# Patient Record
Sex: FEMALE | Race: WHITE
[De-identification: names, ages, dates, MRNs, and addresses within clinical notes are randomized per-mention and may not be internally consistent; named-entity substitution may affect disease eponyms.]

---

## 2018-01-28 ENCOUNTER — HOSPITAL ENCOUNTER (EMERGENCY)
Dept: HOSPITAL 45 - C.EDB | Age: 58
Discharge: HOME | End: 2018-01-28
Payer: COMMERCIAL

## 2018-01-28 VITALS — OXYGEN SATURATION: 96 % | HEART RATE: 101 BPM

## 2018-01-28 VITALS
WEIGHT: 293 LBS | WEIGHT: 293 LBS | HEIGHT: 62.01 IN | HEIGHT: 62.01 IN | BODY MASS INDEX: 53.24 KG/M2 | BODY MASS INDEX: 53.24 KG/M2

## 2018-01-28 VITALS — SYSTOLIC BLOOD PRESSURE: 147 MMHG | DIASTOLIC BLOOD PRESSURE: 89 MMHG | TEMPERATURE: 98.6 F
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
## 2018-01-28 DIAGNOSIS — S99.921A: Primary | ICD-10-CM

## 2018-01-28 DIAGNOSIS — Z79.899: ICD-10-CM
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
## 2018-01-28 DIAGNOSIS — Z79.01: ICD-10-CM
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
## 2018-01-29 NOTE — EMERGENCY ROOM VISIT NOTE
ED Visit Note


First contact with patient:  15:44


CHIEF COMPLAINT: Foot pain  





HISTORY OF PRESENT ILLNESS: This 57 year old white female patient presents to 

the emergency department complaining of swelling and pain in the right foot at 

rest and worse with weight bearing. The patient works at [employer], 

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  Evidently the sorority broke 

one of the tables, and as the patient went to move it, the table collapsed, and 

landed onto her right foot.  The patient is on [place] and now has significant 

bruising and pain. The patient rates the pain as dull and 7/10. The patient has 

taken nothing with relief of the pain. The patient is able to walk. No numbness 

or weakness. No ankle pain. There are no lacerations of the foot. The patient 

is able to move all of their toes and their ankle without pain. No previous 

fracture to this foot.





REVIEW OF SYSTEMS: GENERAL: A 6 system review of systems was completed with 

positives and pertinent negatives in the HPI.





ALLERGIES: Augmentin





MEDICATIONS: See EMR





PMH: See EMR





SOCIAL HISTORY: Employed and lives locally





PHYSICAL EXAM: Vital Signs: Reviewed Nurse's notes, vital signs stable. GENERAL

: White female, in no acute distress, but appears in pain, well-developed, well-

nourished. MUSCULOSKELATAL: There is no visual deformity of the right foot. 

There is notable erythema and ecchymosis over the superior mid foot. There is 

no significant warmth. There is tenderness and swelling over the superior mid 

foot. There is no tenderness over the lateral or medial malleolus.  No 

tenderness of the tib/fib.  The range of motion of the right foot is minimally 

limited secondary to pain. There is no tenderness over the plantar fascia.  The 

skin is intact and there are no lacerations or puncture wounds. Dorsalis pedis 

pulse 2+.  Capillary refill less than 2 seconds. 











RIGHT FOOT 3 VIEWS





HISTORY:      Right foot injury





COMPARISON: None.





FINDINGS: There is no fracture or dislocation. Dorsal soft tissue swelling.


Plantar and posterior calcaneal spurs. The Lisfranc joint is intact. No


radiopaque foreign bodies.





IMPRESSION:  


No fractures. Dorsal soft tissue swelling.











EMERGENCY DEPARTMENT COURSE: I examined the patient. An X-ray of the right foot 

was reviewed by myself and radiology and reveals no fracture or dislocation.  

Patient was placed in a postop shoe for comfort.  I offered her pain medication

, but she states that she prefers to use Tylenol, and can take this at home.  

She was given further discharge instructions as below and otherwise invited 

back to the ER with any new, worsening, or concerning symptoms.


Current/Historical Medications


Scheduled


Apixaban (Eliquis), 5 MG PO BID


Chlorpheniramine-Dm (Coricidin Hbp Cough & Col), 2 TABS PO DAILY


Cholecalciferol (Vitamin D 1000 Unit), 1,000 INTER.UNIT PO BID


Diltiazem Hcl Ext Rel (Tiazac), 180 MG PO QPM


Furosemide (Lasix), 40 MG PO BID17


Lisinopril (Zestril), 10 MG PO QAM


Metoprolol Succ (Toprol Xl) (Toprol-Xl ), 100 MG PO DAILY


Multivitamin (Multivitamin), 1 TAB PO DAILY


Tiotropium Bromide (Spiriva Handihaler), 1 CAP INH DAILY





Scheduled PRN


Acetaminophen (Tylenol), 1,000 MG PO UD PRN for Pain


Albuterol Hfa (Ventolin Hfa), 2 PUFFS INH Q4H PRN for SOB/Wheezing





Allergies


Coded Allergies:  


     Amoxicillin (Verified  Adverse Reaction, Unknown, GI SYMPTOMS- diarrhea, 1/ 28/18)


     Clavulanic Acid (Verified  Adverse Reaction, Unknown, GI SYMPTOMS- diarrhea

, 1/28/18)





Vital Signs











  Date Time  Temp Pulse Resp B/P (MAP) Pulse Ox O2 Delivery O2 Flow Rate FiO2


 


1/28/18 17:12  101 18  96   


 


1/28/18 15:39 37.0 112 18 147/89 92 Room Air  











Departure Information


Impression





 Primary Impression:  


 Injury of right foot





Dispostion


Home / Self-Care





Condition


FAIR





Forms


HOME CARE DOCUMENTATION FORM,                                                 

               Work Instructions,          


   Additional Instructions:  Patient was seen and evaluated today in the 

emergency department fo


      medical care.  Return to work on 2/1/2018.  Please excuse.


IMPORTANT VISIT INFORMATION





Patient Instructions


My [facility], ED [facility]





Additional Instructions





You were seen and evaluated today on an emergency basis only.  This is not a 

substitute for, or an effort to provide, complete comprehensive medical care.  

It is not possible to recognize and treat all injuries or illnesses in a single 

emergency department visit. For this reason it is recommended that you followup 

with your Workmen's Compensation provider for ongoing care and evaluation.





Take Tylenol 1000 mg every 6-8 hours for pain control.





Wear your postop shoe for comfort





You are welcome to return to the emergency department anytime with new, 

worsening, or concerning symptoms.





Work Instructions


Additional Work Instructions:  


Patient was seen and evaluated today in the emergency department for 


medical care.  Return to work on 2/1/2018.  Please excuse.

## 2018-02-02 ENCOUNTER — HOSPITAL ENCOUNTER (OUTPATIENT)
Dept: HOSPITAL 45 - C.RAD1850 | Age: 58
Discharge: HOME | End: 2018-02-02
Attending: NURSE PRACTITIONER
Payer: COMMERCIAL

## 2018-02-02 DIAGNOSIS — M79.671: Primary | ICD-10-CM

## 2018-02-02 NOTE — DIAGNOSTIC IMAGING REPORT
R FOOT MIN 3 VIEWS ROUTINE



CLINICAL HISTORY: Right foot pain status post trauma     



COMPARISON: 1/28/2018



DISCUSSION: 3 views reveal no acute fractures or dislocations. There are no

areas of pathologic periostitis. There is calcaneal spurring. There is

persistent dorsal soft tissue edema    



IMPRESSION: Persistent dorsal soft tissue swelling. No fractures are visualized.







Electronically signed by:  Peter Whitney M.D.

2/2/2018 1:33 PM



Dictated Date/Time:  2/2/2018 1:32 PM